# Patient Record
Sex: MALE | Race: ASIAN | NOT HISPANIC OR LATINO | URBAN - METROPOLITAN AREA
[De-identification: names, ages, dates, MRNs, and addresses within clinical notes are randomized per-mention and may not be internally consistent; named-entity substitution may affect disease eponyms.]

---

## 2018-08-29 ENCOUNTER — EMERGENCY (EMERGENCY)
Facility: HOSPITAL | Age: 43
LOS: 1 days | Discharge: HOME | End: 2018-08-29
Attending: EMERGENCY MEDICINE
Payer: MEDICAID

## 2018-08-29 VITALS
SYSTOLIC BLOOD PRESSURE: 135 MMHG | RESPIRATION RATE: 17 BRPM | OXYGEN SATURATION: 99 % | WEIGHT: 190.04 LBS | DIASTOLIC BLOOD PRESSURE: 87 MMHG | HEART RATE: 78 BPM | HEIGHT: 73 IN | TEMPERATURE: 99 F

## 2018-08-29 VITALS
TEMPERATURE: 98 F | DIASTOLIC BLOOD PRESSURE: 68 MMHG | HEART RATE: 72 BPM | RESPIRATION RATE: 16 BRPM | SYSTOLIC BLOOD PRESSURE: 124 MMHG | OXYGEN SATURATION: 100 %

## 2018-08-29 PROCEDURE — 99283 EMERGENCY DEPT VISIT LOW MDM: CPT

## 2018-08-29 PROCEDURE — 90471 IMMUNIZATION ADMIN: CPT

## 2018-08-29 PROCEDURE — 73630 X-RAY EXAM OF FOOT: CPT | Mod: 26,LT

## 2018-08-29 PROCEDURE — 99283 EMERGENCY DEPT VISIT LOW MDM: CPT | Mod: 25

## 2018-08-29 PROCEDURE — 90715 TDAP VACCINE 7 YRS/> IM: CPT

## 2018-08-29 PROCEDURE — 73630 X-RAY EXAM OF FOOT: CPT

## 2018-08-29 RX ORDER — TETANUS TOXOID, REDUCED DIPHTHERIA TOXOID AND ACELLULAR PERTUSSIS VACCINE, ADSORBED 5; 2.5; 8; 8; 2.5 [IU]/.5ML; [IU]/.5ML; UG/.5ML; UG/.5ML; UG/.5ML
0.5 SUSPENSION INTRAMUSCULAR ONCE
Qty: 0 | Refills: 0 | Status: COMPLETED | OUTPATIENT
Start: 2018-08-29 | End: 2018-08-29

## 2018-08-29 RX ADMIN — TETANUS TOXOID, REDUCED DIPHTHERIA TOXOID AND ACELLULAR PERTUSSIS VACCINE, ADSORBED 0.5 MILLILITER(S): 5; 2.5; 8; 8; 2.5 SUSPENSION INTRAMUSCULAR at 19:31

## 2018-08-29 NOTE — ED PROVIDER NOTE - OBJECTIVE STATEMENT
42 y/o M patient w/ no significant PMHx and no significant PSHx presents to the ED with left foot pain. Patient reports stepping on a nail which went through his shoe into his left foot. Patient states he removed the nail. Patient denies any other complaints. Patient unsure of last tetanus shot. NKDA.

## 2018-12-28 PROBLEM — Z00.00 ENCOUNTER FOR PREVENTIVE HEALTH EXAMINATION: Status: ACTIVE | Noted: 2018-12-28

## 2019-01-14 ENCOUNTER — APPOINTMENT (OUTPATIENT)
Dept: OPHTHALMOLOGY | Facility: CLINIC | Age: 44
End: 2019-01-14
Payer: COMMERCIAL

## 2019-01-14 PROCEDURE — 92020 GONIOSCOPY: CPT

## 2019-01-14 PROCEDURE — 92004 COMPRE OPH EXAM NEW PT 1/>: CPT

## 2019-01-14 PROCEDURE — 92015 DETERMINE REFRACTIVE STATE: CPT

## 2019-01-14 PROCEDURE — 92250 FUNDUS PHOTOGRAPHY W/I&R: CPT

## 2019-01-14 PROCEDURE — 76514 ECHO EXAM OF EYE THICKNESS: CPT

## 2019-04-16 ENCOUNTER — APPOINTMENT (OUTPATIENT)
Dept: OPHTHALMOLOGY | Facility: CLINIC | Age: 44
End: 2019-04-16
Payer: COMMERCIAL

## 2019-04-16 ENCOUNTER — NON-APPOINTMENT (OUTPATIENT)
Age: 44
End: 2019-04-16

## 2019-04-16 PROCEDURE — ZZZZZ: CPT

## 2019-04-16 PROCEDURE — 92133 CPTRZD OPH DX IMG PST SGM ON: CPT

## 2019-04-16 PROCEDURE — 92083 EXTENDED VISUAL FIELD XM: CPT

## 2019-04-16 PROCEDURE — 92012 INTRM OPH EXAM EST PATIENT: CPT

## 2019-05-08 ENCOUNTER — EMERGENCY (EMERGENCY)
Facility: HOSPITAL | Age: 44
LOS: 1 days | Discharge: HOME | End: 2019-05-08
Attending: EMERGENCY MEDICINE
Payer: MEDICAID

## 2019-05-08 VITALS
OXYGEN SATURATION: 100 % | DIASTOLIC BLOOD PRESSURE: 85 MMHG | SYSTOLIC BLOOD PRESSURE: 135 MMHG | RESPIRATION RATE: 20 BRPM | WEIGHT: 199.96 LBS | HEART RATE: 76 BPM | TEMPERATURE: 98 F

## 2019-05-08 PROCEDURE — 12002 RPR S/N/AX/GEN/TRNK2.6-7.5CM: CPT

## 2019-05-08 PROCEDURE — 99283 EMERGENCY DEPT VISIT LOW MDM: CPT | Mod: 25

## 2019-05-08 PROCEDURE — 99283 EMERGENCY DEPT VISIT LOW MDM: CPT

## 2019-05-08 NOTE — ED PROVIDER NOTE - OBJECTIVE STATEMENT
44 y/o M with no significant PMHx presents to the ED with complaints of laceration to L forearm. Patient states today was using  while working and cut his arm. Tetanus vaccination is up to date. Denies any other acute complaints.

## 2019-05-08 NOTE — ED PROVIDER NOTE - PROGRESS NOTE DETAILS
Return in 7 days for suture removal. Pt is well appearing walking with steady gait, stable for discharge and follow up without fail with medical doctor. I had a detailed discussion with the patient and/or guardian regarding the historical points, exam findings, and any diagnostic results supporting the discharge diagnosis. Pt educated on care and need for follow up. Strict return instructions and red flag signs and symptoms discussed with patient. Questions answered. Pt shows understanding of discharge information and agrees to follow.

## 2019-05-21 ENCOUNTER — EMERGENCY (EMERGENCY)
Facility: HOSPITAL | Age: 44
LOS: 1 days | Discharge: HOME | End: 2019-05-21
Attending: EMERGENCY MEDICINE
Payer: MEDICAID

## 2019-05-21 VITALS
SYSTOLIC BLOOD PRESSURE: 136 MMHG | DIASTOLIC BLOOD PRESSURE: 84 MMHG | TEMPERATURE: 98 F | HEART RATE: 68 BPM | RESPIRATION RATE: 16 BRPM | OXYGEN SATURATION: 98 % | WEIGHT: 199.96 LBS

## 2019-05-21 PROCEDURE — 99281 EMR DPT VST MAYX REQ PHY/QHP: CPT

## 2019-05-21 NOTE — ED PROVIDER NOTE - OBJECTIVE STATEMENT
43 year-old male, presents with cc L forearm suture removal s/p lac repair 14 days ago. Denies any pain, redness, swelling, fever or any other complaints.

## 2019-05-21 NOTE — ED PROCEDURE NOTE - ATTENDING CONTRIBUTION TO CARE
I was present for supervision of the procedure and I agree with the NP documentation regarding this procedure.

## 2019-05-21 NOTE — ED PROVIDER NOTE - PHYSICAL EXAMINATION
L forearm 7 sutures in situ. No erythema, induration or drainage. No localized tenderness to palpation.

## 2019-05-21 NOTE — ED PROVIDER NOTE - ATTENDING CONTRIBUTION TO CARE
I conducted a face-to-face interaction with patient and I agree with NP documentation and plan.  Pt presents  Exam:  Skin: sutures in place    A/P:  Sutures removed, wound healing well

## 2019-05-21 NOTE — ED PROVIDER NOTE - PROGRESS NOTE DETAILS
Sutures removed. No signs of infection or dehiscence. Will dc with PMD follow up as needed. Pt is well appearing walking with steady gait, stable for discharge and follow up without fail with medical doctor. I had a detailed discussion with the patient and/or guardian regarding the historical points, exam findings, and any diagnostic results supporting the discharge diagnosis. Pt educated on care and need for follow up. Strict return instructions and red flag signs and symptoms discussed with patient. Questions answered. Pt shows understanding of discharge information and agrees to follow.

## 2019-07-25 ENCOUNTER — EMERGENCY (EMERGENCY)
Facility: HOSPITAL | Age: 44
LOS: 1 days | Discharge: HOME | End: 2019-07-25
Attending: EMERGENCY MEDICINE
Payer: MEDICAID

## 2019-07-25 VITALS
DIASTOLIC BLOOD PRESSURE: 97 MMHG | SYSTOLIC BLOOD PRESSURE: 151 MMHG | RESPIRATION RATE: 18 BRPM | OXYGEN SATURATION: 99 % | WEIGHT: 210.1 LBS | TEMPERATURE: 99 F | HEART RATE: 64 BPM

## 2019-07-25 DIAGNOSIS — Z98.890 OTHER SPECIFIED POSTPROCEDURAL STATES: Chronic | ICD-10-CM

## 2019-07-25 LAB
ALBUMIN SERPL ELPH-MCNC: 4.3 G/DL — SIGNIFICANT CHANGE UP (ref 3.5–5)
ALP SERPL-CCNC: 63 U/L — SIGNIFICANT CHANGE UP (ref 40–120)
ALT FLD-CCNC: 36 U/L DA — SIGNIFICANT CHANGE UP (ref 10–60)
ANION GAP SERPL CALC-SCNC: 6 MMOL/L — SIGNIFICANT CHANGE UP (ref 5–17)
APPEARANCE UR: CLEAR — SIGNIFICANT CHANGE UP
AST SERPL-CCNC: 16 U/L — SIGNIFICANT CHANGE UP (ref 10–40)
BASOPHILS # BLD AUTO: 0.02 K/UL — SIGNIFICANT CHANGE UP (ref 0–0.2)
BASOPHILS NFR BLD AUTO: 0.1 % — SIGNIFICANT CHANGE UP (ref 0–2)
BILIRUB SERPL-MCNC: 1 MG/DL — SIGNIFICANT CHANGE UP (ref 0.2–1.2)
BILIRUB UR-MCNC: NEGATIVE — SIGNIFICANT CHANGE UP
BUN SERPL-MCNC: 16 MG/DL — SIGNIFICANT CHANGE UP (ref 7–18)
CALCIUM SERPL-MCNC: 9.2 MG/DL — SIGNIFICANT CHANGE UP (ref 8.4–10.5)
CHLORIDE SERPL-SCNC: 103 MMOL/L — SIGNIFICANT CHANGE UP (ref 96–108)
CO2 SERPL-SCNC: 29 MMOL/L — SIGNIFICANT CHANGE UP (ref 22–31)
COLOR SPEC: YELLOW — SIGNIFICANT CHANGE UP
CREAT SERPL-MCNC: 1.43 MG/DL — HIGH (ref 0.5–1.3)
DIFF PNL FLD: ABNORMAL
EOSINOPHIL # BLD AUTO: 0.01 K/UL — SIGNIFICANT CHANGE UP (ref 0–0.5)
EOSINOPHIL NFR BLD AUTO: 0.1 % — SIGNIFICANT CHANGE UP (ref 0–6)
GLUCOSE SERPL-MCNC: 108 MG/DL — HIGH (ref 70–99)
GLUCOSE UR QL: NEGATIVE — SIGNIFICANT CHANGE UP
HCT VFR BLD CALC: 47 % — SIGNIFICANT CHANGE UP (ref 39–50)
HGB BLD-MCNC: 15.8 G/DL — SIGNIFICANT CHANGE UP (ref 13–17)
IMM GRANULOCYTES NFR BLD AUTO: 0.4 % — SIGNIFICANT CHANGE UP (ref 0–1.5)
KETONES UR-MCNC: ABNORMAL
LEUKOCYTE ESTERASE UR-ACNC: NEGATIVE — SIGNIFICANT CHANGE UP
LIDOCAIN IGE QN: 124 U/L — SIGNIFICANT CHANGE UP (ref 73–393)
LYMPHOCYTES # BLD AUTO: 1.45 K/UL — SIGNIFICANT CHANGE UP (ref 1–3.3)
LYMPHOCYTES # BLD AUTO: 10.6 % — LOW (ref 13–44)
MCHC RBC-ENTMCNC: 29.4 PG — SIGNIFICANT CHANGE UP (ref 27–34)
MCHC RBC-ENTMCNC: 33.6 GM/DL — SIGNIFICANT CHANGE UP (ref 32–36)
MCV RBC AUTO: 87.5 FL — SIGNIFICANT CHANGE UP (ref 80–100)
MONOCYTES # BLD AUTO: 1.09 K/UL — HIGH (ref 0–0.9)
MONOCYTES NFR BLD AUTO: 8 % — SIGNIFICANT CHANGE UP (ref 2–14)
NEUTROPHILS # BLD AUTO: 11.04 K/UL — HIGH (ref 1.8–7.4)
NEUTROPHILS NFR BLD AUTO: 80.8 % — HIGH (ref 43–77)
NITRITE UR-MCNC: NEGATIVE — SIGNIFICANT CHANGE UP
NRBC # BLD: 0 /100 WBCS — SIGNIFICANT CHANGE UP (ref 0–0)
PH UR: 7 — SIGNIFICANT CHANGE UP (ref 5–8)
PLATELET # BLD AUTO: 222 K/UL — SIGNIFICANT CHANGE UP (ref 150–400)
POTASSIUM SERPL-MCNC: 3.8 MMOL/L — SIGNIFICANT CHANGE UP (ref 3.5–5.3)
POTASSIUM SERPL-SCNC: 3.8 MMOL/L — SIGNIFICANT CHANGE UP (ref 3.5–5.3)
PROT SERPL-MCNC: 8.5 G/DL — HIGH (ref 6–8.3)
PROT UR-MCNC: 15
RBC # BLD: 5.37 M/UL — SIGNIFICANT CHANGE UP (ref 4.2–5.8)
RBC # FLD: 11.9 % — SIGNIFICANT CHANGE UP (ref 10.3–14.5)
SODIUM SERPL-SCNC: 138 MMOL/L — SIGNIFICANT CHANGE UP (ref 135–145)
SP GR SPEC: 1.01 — SIGNIFICANT CHANGE UP (ref 1.01–1.02)
UROBILINOGEN FLD QL: NEGATIVE — SIGNIFICANT CHANGE UP
WBC # BLD: 13.66 K/UL — HIGH (ref 3.8–10.5)
WBC # FLD AUTO: 13.66 K/UL — HIGH (ref 3.8–10.5)

## 2019-07-25 PROCEDURE — 74176 CT ABD & PELVIS W/O CONTRAST: CPT

## 2019-07-25 PROCEDURE — 80053 COMPREHEN METABOLIC PANEL: CPT

## 2019-07-25 PROCEDURE — 81001 URINALYSIS AUTO W/SCOPE: CPT

## 2019-07-25 PROCEDURE — 85027 COMPLETE CBC AUTOMATED: CPT

## 2019-07-25 PROCEDURE — 87086 URINE CULTURE/COLONY COUNT: CPT

## 2019-07-25 PROCEDURE — 36415 COLL VENOUS BLD VENIPUNCTURE: CPT

## 2019-07-25 PROCEDURE — 74176 CT ABD & PELVIS W/O CONTRAST: CPT | Mod: 26

## 2019-07-25 PROCEDURE — 99285 EMERGENCY DEPT VISIT HI MDM: CPT

## 2019-07-25 PROCEDURE — 83690 ASSAY OF LIPASE: CPT

## 2019-07-25 PROCEDURE — 99285 EMERGENCY DEPT VISIT HI MDM: CPT | Mod: 25

## 2019-07-25 RX ORDER — TAMSULOSIN HYDROCHLORIDE 0.4 MG/1
0.4 CAPSULE ORAL ONCE
Refills: 0 | Status: COMPLETED | OUTPATIENT
Start: 2019-07-25 | End: 2019-07-25

## 2019-07-25 RX ORDER — TAMSULOSIN HYDROCHLORIDE 0.4 MG/1
0 CAPSULE ORAL
Qty: 0 | Refills: 0 | DISCHARGE

## 2019-07-25 RX ORDER — KETOROLAC TROMETHAMINE 30 MG/ML
15 SYRINGE (ML) INJECTION ONCE
Refills: 0 | Status: DISCONTINUED | OUTPATIENT
Start: 2019-07-25 | End: 2019-07-25

## 2019-07-25 RX ORDER — IBUPROFEN 200 MG
1 TABLET ORAL
Qty: 15 | Refills: 0
Start: 2019-07-25 | End: 2019-07-29

## 2019-07-25 RX ADMIN — TAMSULOSIN HYDROCHLORIDE 0.4 MILLIGRAM(S): 0.4 CAPSULE ORAL at 19:55

## 2019-07-25 RX ADMIN — Medication 15 MILLIGRAM(S): at 16:32

## 2019-07-25 NOTE — ED PROVIDER NOTE - CONSTITUTIONAL, MLM
normal... well nourished, awake, alert, oriented to person, place, time/situation and in mild distress d/t pain.

## 2019-07-25 NOTE — ED PROVIDER NOTE - CLINICAL SUMMARY MEDICAL DECISION MAKING FREE TEXT BOX
43M c/o R flank pain after recent lithotripsy  -as per PA from private office pt had 2cm stone  -will check labs and ct a/p  -analgesics

## 2019-07-25 NOTE — ED PROVIDER NOTE - OBJECTIVE STATEMENT
patient had lithotripsy yesterday and he has had R flank pain that started 2h after the procedure. the pain is not relieved by vicodin. he called his urologist (Dr. Sharma) who told him to come to the ED patient had lithotripsy yesterday and he has had R flank pain that started 2h after the procedure. the pain is not relieved by vicodin. he called his urologist (Dr. Strange) who told him to come to the ED

## 2019-07-25 NOTE — ED PROVIDER NOTE - PROGRESS NOTE DETAILS
I spoke to RAQUEL Galindo of Dr. Strange's office who informed me that the patient had a 2cm R UPJ stone so will order ct to see if he has uretral obstruction, and call Dr. Sheila Ken if he needs a stent. pain improved and patient tolerated po challenge. I spoke to Dr. Ken and he reccomends that the patient continue with outpatient pain meds and flomax and followup on Monday with Dr. Strange. patient agrees with the plan

## 2019-07-25 NOTE — ED ADULT NURSE NOTE - NSIMPLEMENTINTERV_GEN_ALL_ED
Implemented All Universal Safety Interventions:  [place] to call system. Call bell, personal items and telephone within reach. Instruct patient to call for assistance. Room bathroom lighting operational. Non-slip footwear when patient is off stretcher. Physically safe environment: no spills, clutter or unnecessary equipment. Stretcher in lowest position, wheels locked, appropriate side rails in place.

## 2019-07-26 LAB
CULTURE RESULTS: NO GROWTH — SIGNIFICANT CHANGE UP
SPECIMEN SOURCE: SIGNIFICANT CHANGE UP

## 2022-01-21 NOTE — ED ADULT NURSE NOTE - NS PRO PASSIVE SMOKE EXP
Discharge instructions given and explained to pt. Pt is stable and alert with indication of SOB. Pt discharged and ambulated out of ED.    Functional No

## 2022-12-09 ENCOUNTER — EMERGENCY (EMERGENCY)
Facility: HOSPITAL | Age: 47
LOS: 1 days | Discharge: HOME | End: 2022-12-09
Attending: EMERGENCY MEDICINE | Admitting: EMERGENCY MEDICINE
Payer: MEDICAID

## 2022-12-09 VITALS
HEART RATE: 74 BPM | RESPIRATION RATE: 18 BRPM | TEMPERATURE: 99 F | OXYGEN SATURATION: 97 % | DIASTOLIC BLOOD PRESSURE: 96 MMHG | SYSTOLIC BLOOD PRESSURE: 150 MMHG

## 2022-12-09 VITALS
TEMPERATURE: 98 F | SYSTOLIC BLOOD PRESSURE: 157 MMHG | OXYGEN SATURATION: 96 % | HEIGHT: 71 IN | WEIGHT: 210.1 LBS | HEART RATE: 80 BPM | RESPIRATION RATE: 20 BRPM | DIASTOLIC BLOOD PRESSURE: 101 MMHG

## 2022-12-09 DIAGNOSIS — Z98.890 OTHER SPECIFIED POSTPROCEDURAL STATES: Chronic | ICD-10-CM

## 2022-12-09 PROCEDURE — 73630 X-RAY EXAM OF FOOT: CPT

## 2022-12-09 PROCEDURE — 99284 EMERGENCY DEPT VISIT MOD MDM: CPT

## 2022-12-09 PROCEDURE — 73630 X-RAY EXAM OF FOOT: CPT | Mod: 26,LT

## 2022-12-09 PROCEDURE — 99283 EMERGENCY DEPT VISIT LOW MDM: CPT | Mod: 25

## 2022-12-09 RX ORDER — CIPROFLOXACIN LACTATE 400MG/40ML
500 VIAL (ML) INTRAVENOUS ONCE
Refills: 0 | Status: COMPLETED | OUTPATIENT
Start: 2022-12-09 | End: 2022-12-09

## 2022-12-09 RX ORDER — IBUPROFEN 200 MG
600 TABLET ORAL ONCE
Refills: 0 | Status: COMPLETED | OUTPATIENT
Start: 2022-12-09 | End: 2022-12-09

## 2022-12-09 RX ORDER — CIPROFLOXACIN LACTATE 400MG/40ML
1 VIAL (ML) INTRAVENOUS
Qty: 20 | Refills: 0
Start: 2022-12-09 | End: 2022-12-18

## 2022-12-09 RX ADMIN — Medication 500 MILLIGRAM(S): at 12:01

## 2022-12-09 RX ADMIN — Medication 600 MILLIGRAM(S): at 12:01

## 2022-12-09 NOTE — ED ADULT NURSE NOTE - OBJECTIVE STATEMENT
47yM, A&Ox4, independent, presents to ED c/o left foot and left 4th toe pain after stepping on nail yesterday. Puncture site well appearing, no blood discharge or drainage. No other complaints at this time

## 2022-12-09 NOTE — ED PROVIDER NOTE - NSFOLLOWUPINSTRUCTIONS_ED_ALL_ED_FT
Take Cipro 500 mg orally twice a day for 10 days  Follow-up with podiatry in 1 to days  Tylenol Motrin for the pain  Return to the ED for worsening pain, redness around wound, drainage from wound, fevers or chills, or any concerns.  ****  A puncture wound is an injury that is caused by a sharp, thin object that goes through (penetrates) your skin. Usually, a puncture wound does not leave a large opening in your skin, so it may not bleed a lot. However, when you get a puncture wound, dirt or other materials (foreign bodies) can be forced into your wound and can break off inside. This increases the chance of infection, such as tetanus. There are many sharp, pointed objects that can cause puncture wounds, including teeth, nails, splinters of glass, fishhooks, and needles.    Treatment may include washing out the wound with a germ-free (sterile) salt-water solution, having the wound opened surgically to remove a foreign object, closing the wound with stitches (sutures), and covering the wound with antibiotic ointment and a bandage (dressing). Depending on what caused the injury, you may also need a tetanus shot or a rabies shot.      Follow these instructions at home:    Medicines     •Take or apply over-the-counter and prescription medicines only as told by your health care provider.      •If you were prescribed an antibiotic medicine, take or apply it as told by your health care provider. Do not stop using the antibiotic even if your condition improves.      Bathing     •Keep the dressing dry as told by your health care provider.      • Do not take baths, swim, or use a hot tub until your health care provider approves. Ask your health care provider if you may take showers. You may only be allowed to take sponge baths.        Wound care   Two puncture sites on the skin. One is normal and the other is red with pus. •There are many ways to close and cover a wound. For example, a wound can be closed with sutures, skin glue, or adhesive strips. Follow instructions from your health care provider about how to take care of your wound. Make sure you:  •Wash your hands with soap and water before and after you change your dressing. If soap and water are not available, use hand .      •Change your dressing as told by your health care provider.      •Leave sutures, skin glue, or adhesive strips in place. These skin closures may need to stay in place for 2 weeks or longer. If adhesive strip edges start to loosen and curl up, you may trim the loose edges. Do not remove adhesive strips completely unless your health care provider tells you to do that.        •Clean the wound as told by your health care provider.      • Do not scratch or pick at the wound.    •Check your wound every day for signs of infection. Check for:  •Redness, swelling, or pain.      •Fluid or blood.      •Warmth.      •Pus or a bad smell.        General instructions     •Raise (elevate) the injured area above the level of your heart while you are sitting or lying down.      •If your puncture wound is in your foot, ask your health care provider if you need to avoid putting weight on your foot and for how long. Do not use the injured limb to support your body weight until your health care provider says that you can. Use crutches as told by your health care provider.      •Keep all follow-up visits as told by your health care provider. This is important.        Contact a health care provider if:    •You received a tetanus shot and you have swelling, severe pain, redness, or bleeding at the injection site.      •You have a fever.      •Your sutures come out.      •You notice a bad smell coming from your wound or your dressing.      •You notice something coming out of your wound, such as wood or glass.      •Your pain is not controlled with medicine.      •You have increased redness, swelling, or pain at the site of your wound.      •You have fluid, blood, or pus coming from your wound.      •You notice a change in the color of your skin near your wound.      •You need to change the dressing frequently due to fluid, blood, or pus draining from your wound.      •You develop a new rash.      •You develop numbness around your wound.      •You have warmth around your wound.        Get help right away if:    •You develop severe swelling around your wound.      •Your pain suddenly increases and is severe.      •You develop painful skin lumps.      •You have a red streak going away from your wound.    •The wound is on your hand or foot and you:  •Cannot properly move a finger or toe.      •Notice that your fingers or toes look pale or bluish.          Summary    •A puncture wound is an injury that is caused by a sharp, thin object that goes through (penetrates) your skin.      •Treatment may include washing out the wound, having the wound opened surgically to remove a foreign object, closing the wound with stitches (sutures), and covering the wound with antibiotic ointment and a bandage (dressing).      •Follow instructions from your health care provider about how to take care of your wound.      •Contact a health care provider if you have increased redness, swelling, or pain at the site of your wound.      •Keep all follow-up visits as told by your health care provider. This is important.      This information is not intended to replace advice given to you by your health care provider. Make sure you discuss any questions you have with your health care provider.

## 2022-12-09 NOTE — ED PROVIDER NOTE - OBJECTIVE STATEMENT
Dr. Tomlinson: 47-year-old male no past medical history, not any medications, tdap 4 years ago, presenting with left foot pain status with stepping on a nail through his shoe yesterday.  Did not seek medical care right away because it was late at night.  Able to ambulate. Dr. Tomlinson: 47-year-old male no past medical history, not on any medications, tdap 4 years ago, presenting with left foot pain s/p stepping on a nail through his shoe yesterday.  Did not seek medical care right away because it was late at night.  Able to ambulate.

## 2022-12-09 NOTE — ED PROVIDER NOTE - CLINICAL SUMMARY MEDICAL DECISION MAKING FREE TEXT BOX
Patient with puncture wound through shoe.  Tdap is up-to-date.  Will get x-ray.  Cipro to cover Pseudomonas.  X-ray with no abnormalities.  Will discharge with podiatry follow-up and Cipro

## 2022-12-09 NOTE — ED PROVIDER NOTE - PATIENT PORTAL LINK FT
You can access the FollowMyHealth Patient Portal offered by [facility] by registering at the following website: [email or web address]. By joining [employer]’s FollowMyHealth portal, you will also be able to view your health information using other applications (apps) compatible with our system.

## 2023-09-10 NOTE — ED ADULT NURSE NOTE - CHPI ED NUR SYMPTOMS NEG
You were seen in the emergency department today for a cough.     Continue to use your albuterol inhaler as prescribed.     Use the prednisone as prescribed to help reduce the inflammation in your lungs. This can help with the cough.    You can use Sudafed or an antiallergy medication like Zyrtec or Claritin during the day for congestion.  You can use Benadryl at night to help you with sleep and congestion.      Please return to the ER if you experience chest pain, shortness of breath, fever not better with tylenol, and/or for any other new or concerning symptoms, otherwise please follow up with your primary doctor in 5-7 days for recheck.     Below is some information that you might find informative and useful.    Thank you for choosing [facility]. It was a pleasure taking care of you today!  - Dr. Layla Miguel   
no fever/no redness

## 2023-11-07 NOTE — ED PROVIDER NOTE - DATE/TIME 2
November 7, 2023      [facility]  [address] [place] [address]  Phone: [phone]  Fax: [phone]       Patient: Scooter Rich    YOB: 2017  Date of Visit: 11/07/2023    To Whom It May Concern:    Renee Rich   was at [facility] on 11/07/2023. The patient may return to work/school on 11/8/2023 with no restrictions. If you have any questions or concerns, or if I can be of further assistance, please do not hesitate to contact me.    Sincerely,    Anil Banuelos, CCC-A     
25-Jul-2019 20:29

## 2023-11-30 PROBLEM — N20.0 CALCULUS OF KIDNEY: Chronic | Status: ACTIVE | Noted: 2019-07-25

## 2024-01-12 NOTE — ED PROVIDER NOTE - CPE EDP PSYCH NORM
Continue his chronic aspirin and atorvastatin therapy, and I reviewed his lipid profile from June 2023 followed by his primary care physician.  Cholesterol 140, triglycerides 60, LDL was 76 and HDL is 72 which is excellent lipid risk reduction.  His metabolic profile showed stable hepatic and renal function.    Increase his amlodipine to 5 mg daily for better blood pressure control until he is successful at committing to resuming his exercise.    Commitment to daily walking/exercise and a low carbohydrate and low sugar [place]/Mediterranean dietary lifestyle.    
English
normal...

## 2024-02-22 ENCOUNTER — EMERGENCY (EMERGENCY)
Facility: HOSPITAL | Age: 49
LOS: 1 days | Discharge: HOME | End: 2024-02-22
Attending: EMERGENCY MEDICINE
Payer: MEDICAID

## 2024-02-22 VITALS
DIASTOLIC BLOOD PRESSURE: 89 MMHG | HEIGHT: 71 IN | TEMPERATURE: 98 F | RESPIRATION RATE: 16 BRPM | SYSTOLIC BLOOD PRESSURE: 147 MMHG | HEART RATE: 75 BPM | OXYGEN SATURATION: 98 % | WEIGHT: 214.95 LBS

## 2024-02-22 DIAGNOSIS — Z98.890 OTHER SPECIFIED POSTPROCEDURAL STATES: Chronic | ICD-10-CM

## 2024-02-22 PROCEDURE — 90715 TDAP VACCINE 7 YRS/> IM: CPT

## 2024-02-22 PROCEDURE — 99283 EMERGENCY DEPT VISIT LOW MDM: CPT | Mod: 25

## 2024-02-22 PROCEDURE — 99284 EMERGENCY DEPT VISIT MOD MDM: CPT

## 2024-02-22 PROCEDURE — 73140 X-RAY EXAM OF FINGER(S): CPT | Mod: 26,LT

## 2024-02-22 PROCEDURE — 73140 X-RAY EXAM OF FINGER(S): CPT

## 2024-02-22 PROCEDURE — 90471 IMMUNIZATION ADMIN: CPT

## 2024-02-22 RX ORDER — TETANUS TOXOID, REDUCED DIPHTHERIA TOXOID AND ACELLULAR PERTUSSIS VACCINE, ADSORBED 5; 2.5; 8; 8; 2.5 [IU]/.5ML; [IU]/.5ML; UG/.5ML; UG/.5ML; UG/.5ML
0.5 SUSPENSION INTRAMUSCULAR ONCE
Refills: 0 | Status: COMPLETED | OUTPATIENT
Start: 2024-02-22 | End: 2024-02-22

## 2024-02-22 RX ADMIN — TETANUS TOXOID, REDUCED DIPHTHERIA TOXOID AND ACELLULAR PERTUSSIS VACCINE, ADSORBED 0.5 MILLILITER(S): 5; 2.5; 8; 8; 2.5 SUSPENSION INTRAMUSCULAR at 12:51

## 2024-02-22 NOTE — ED ADULT NURSE NOTE - OBJECTIVE STATEMENT
Patient presented to the ED complaining of left second finger injury last week after a screw went into his finger. Patient states he did not go to the hospital during the time.

## 2024-02-22 NOTE — ED ADULT TRIAGE NOTE - BMI (KG/M2)
30
What Type Of Note Output Would You Prefer (Optional)?: Standard Output
How Severe Are Your Spot(S)?: mild
Have Your Spot(S) Been Treated In The Past?: has not been treated
Hpi Title: Evaluation of Skin Lesions

## 2024-02-22 NOTE — ED PROVIDER NOTE - OBJECTIVE STATEMENT
48-year-old male no pertinent past medical history presenting to emergency department for evaluation of left second digit injury.  Patient states last week at work he was using a drill and a screw punctured left second digit.  Did not seek medical care after.  Is concerned that the left finger is infected.  Denies difficulty ranging left second digit, numbness, fever, purulent drainage, other complaint.

## 2024-02-22 NOTE — ED PROVIDER NOTE - CLINICAL SUMMARY MEDICAL DECISION MAKING FREE TEXT BOX
48-year-old male no pertinent past medical history presenting to emergency department for evaluation of left second digit injury.  Patient states last week at work he was using a drill and a screw punctured left second digit.  Did not seek medical care after.  Is concerned that the left finger is infected.  Denies difficulty ranging left second digit, numbness, fever, purulent drainage, other complaint. PE as above, do not suspect fracture or dislocation, no evidence of active infection.  Will obtain x-ray, update Tdap, refer to hand.

## 2024-02-22 NOTE — ED ADULT TRIAGE NOTE - SOURCE OF INFORMATION
----- Message from Shelby Davis PA-C sent at 5/24/2019  1:04 PM CDT -----  Results of your glucose were as follows 100.   Follow up as discussed at time of your procedure.     Sincerely,  Dr Sims's office.      Patient

## 2024-02-22 NOTE — ED PROVIDER NOTE - PATIENT PORTAL LINK FT
You can access the FollowMyHealth Patient Portal offered by [facility] by registering at the following website: [email or web address]. By joining [employer]’s FollowMyHealth portal, you will also be able to view your health information using other applications (apps) compatible with our system.

## 2024-02-22 NOTE — ED PROVIDER NOTE - NSFOLLOWUPINSTRUCTIONS_ED_ALL_ED_FT
1) Follow up with your doctor as discussed  2) Return to the ED immediately for new or worsening symptoms  3) Please continue to take any home medications as prescribed  4) Your test results from your ED visit were discussed with you prior to discharge  5) You may take Tylenol and or Motrin for pain [address]  [address] [place] [address]

## 2024-02-22 NOTE — ED PROVIDER NOTE - PHYSICAL EXAMINATION
Gen: NAD, AOx3, able to make needs known, non-toxic  Head: NCAT  HEENT: EOMI, oral mucosa moist, normal conjunctiva  Lung: CTAB, no respiratory distress, no wheezes/rhonchi/rales B/L, speaking in full sentences  CV: RRR, no murmurs  Abd: non distended, soft, nontender, no guarding, no CVA tenderness  MSK: no visible deformities, +healed wound to lateral side of left 2nd digit PIPJ, No surrounding induration, fluctuance, erythema, crepitus. FROM finger, sensation intact  Neuro: Appears non focal  Skin: Warm, well perfused, no rash  Psych: normal affect

## 2024-02-22 NOTE — ED ADULT NURSE NOTE - NSFALLUNIVINTERV_ED_ALL_ED
Bed/Stretcher in lowest position, wheels locked, appropriate side rails in place/Call bell, personal items and telephone in reach/Instruct patient to call for assistance before getting out of bed/chair/stretcher/Non-slip footwear applied when patient is off stretcher/[place] to call system/Physically safe environment - no spills, clutter or unnecessary equipment/Purposeful proactive rounding/Room/bathroom lighting operational, light cord in reach

## 2024-02-22 NOTE — ED ADULT NURSE NOTE - NS ED NURSE LEVEL OF CONSCIOUSNESS AFFECT
Price (Use Numbers Only, No Special Characters Or $): 100.00
Detail Level: Zone
Blade: 10 blade scalpel
Pre-Procedure Text: The patient was placed in a recumbant position on the procedure table. A surface cleanse was performed to remove makeup, followed by a treatment cleanse and tone.
Post-Procedure Instructions: Following the dermaplane treatment, The 2% lactic peel was applied and removed with neutralizer after 1 minute.  The phyto mask was then applied and removed after 3 minutes.  Serum and moisturizer was applied before applying a physical spf.
Post-Care Instructions: I reviewed with the patient in detail post-care instructions.
Treatment Areas: face
Calm

## 2024-04-24 NOTE — ED ADULT NURSE NOTE - NSFALLRSKHARMRISK_ED_ALL_ED
"Initial BP (!) 144/73 (BP Location: Right arm, Patient Position: Sitting, Cuff Size: Adult Large)   Pulse 99   Temp 97.3  F (36.3  C) (Tympanic)  Estimated body mass index is 35.7 kg/m  as calculated from the following:    Height as of 3/26/24: 1.626 m (5' 4\").    Weight as of 3/26/24: 94.3 kg (208 lb). .  Amy Rai MA    "
no

## 2024-05-02 NOTE — ED ADULT TRIAGE NOTE - STATUS:
Oriented - self; Oriented - place; Oriented - time
Applied
Showering allowed/Stairs allowed/Walking - Indoors allowed/No heavy lifting/straining/Walking - Outdoors allowed/Follow Instructions Provided by your Surgical Team

## 2024-06-30 ENCOUNTER — EMERGENCY (EMERGENCY)
Facility: HOSPITAL | Age: 49
LOS: 1 days | Discharge: HOME | End: 2024-06-30
Attending: EMERGENCY MEDICINE
Payer: COMMERCIAL

## 2024-06-30 VITALS
TEMPERATURE: 98 F | DIASTOLIC BLOOD PRESSURE: 130 MMHG | OXYGEN SATURATION: 100 % | SYSTOLIC BLOOD PRESSURE: 179 MMHG | HEART RATE: 120 BPM | RESPIRATION RATE: 20 BRPM

## 2024-06-30 VITALS
DIASTOLIC BLOOD PRESSURE: 101 MMHG | SYSTOLIC BLOOD PRESSURE: 158 MMHG | TEMPERATURE: 98 F | RESPIRATION RATE: 18 BRPM | HEART RATE: 74 BPM | OXYGEN SATURATION: 100 %

## 2024-06-30 DIAGNOSIS — Z98.890 OTHER SPECIFIED POSTPROCEDURAL STATES: Chronic | ICD-10-CM

## 2024-06-30 PROCEDURE — 71045 X-RAY EXAM CHEST 1 VIEW: CPT

## 2024-06-30 PROCEDURE — 71045 X-RAY EXAM CHEST 1 VIEW: CPT | Mod: 26,59

## 2024-06-30 PROCEDURE — 96374 THER/PROPH/DIAG INJ IV PUSH: CPT

## 2024-06-30 PROCEDURE — 73502 X-RAY EXAM HIP UNI 2-3 VIEWS: CPT

## 2024-06-30 PROCEDURE — 71100 X-RAY EXAM RIBS UNI 2 VIEWS: CPT

## 2024-06-30 PROCEDURE — 99284 EMERGENCY DEPT VISIT MOD MDM: CPT

## 2024-06-30 PROCEDURE — 73564 X-RAY EXAM KNEE 4 OR MORE: CPT

## 2024-06-30 PROCEDURE — 99284 EMERGENCY DEPT VISIT MOD MDM: CPT | Mod: 25

## 2024-06-30 PROCEDURE — 72070 X-RAY EXAM THORAC SPINE 2VWS: CPT

## 2024-06-30 PROCEDURE — 73564 X-RAY EXAM KNEE 4 OR MORE: CPT | Mod: 26,LT

## 2024-06-30 PROCEDURE — 71101 X-RAY EXAM UNILAT RIBS/CHEST: CPT

## 2024-06-30 PROCEDURE — 73502 X-RAY EXAM HIP UNI 2-3 VIEWS: CPT | Mod: 26,LT

## 2024-06-30 PROCEDURE — 72070 X-RAY EXAM THORAC SPINE 2VWS: CPT | Mod: 26

## 2024-06-30 PROCEDURE — 93005 ELECTROCARDIOGRAM TRACING: CPT

## 2024-06-30 PROCEDURE — 71100 X-RAY EXAM RIBS UNI 2 VIEWS: CPT | Mod: 26

## 2024-06-30 RX ORDER — KETOROLAC TROMETHAMINE 30 MG/ML
15 INJECTION, SOLUTION INTRAMUSCULAR ONCE
Refills: 0 | Status: DISCONTINUED | OUTPATIENT
Start: 2024-06-30 | End: 2024-06-30

## 2024-06-30 RX ADMIN — KETOROLAC TROMETHAMINE 15 MILLIGRAM(S): 30 INJECTION, SOLUTION INTRAMUSCULAR at 20:44
